# Patient Record
Sex: FEMALE | Race: WHITE | NOT HISPANIC OR LATINO | URBAN - METROPOLITAN AREA
[De-identification: names, ages, dates, MRNs, and addresses within clinical notes are randomized per-mention and may not be internally consistent; named-entity substitution may affect disease eponyms.]

---

## 2018-06-01 ENCOUNTER — EMERGENCY (EMERGENCY)
Facility: HOSPITAL | Age: 28
LOS: 1 days | Discharge: HOME | End: 2018-06-01
Attending: EMERGENCY MEDICINE
Payer: COMMERCIAL

## 2018-06-01 ENCOUNTER — OUTPATIENT (OUTPATIENT)
Dept: OUTPATIENT SERVICES | Facility: HOSPITAL | Age: 28
LOS: 1 days | End: 2018-06-01

## 2018-06-01 VITALS
WEIGHT: 199.96 LBS | RESPIRATION RATE: 20 BRPM | DIASTOLIC BLOOD PRESSURE: 72 MMHG | HEIGHT: 66 IN | SYSTOLIC BLOOD PRESSURE: 114 MMHG | OXYGEN SATURATION: 100 % | HEART RATE: 80 BPM | TEMPERATURE: 98 F

## 2018-06-01 PROCEDURE — 99283 EMERGENCY DEPT VISIT LOW MDM: CPT

## 2018-06-01 PROCEDURE — 99284 EMERGENCY DEPT VISIT MOD MDM: CPT

## 2018-06-01 RX ORDER — AZITHROMYCIN 500 MG/1
1 TABLET, FILM COATED ORAL
Qty: 6 | Refills: 0 | OUTPATIENT
Start: 2018-06-01 | End: 2018-06-06

## 2018-06-01 RX ORDER — IBUPROFEN 200 MG
1 TABLET ORAL
Qty: 20 | Refills: 0 | OUTPATIENT
Start: 2018-06-01 | End: 2018-06-05

## 2018-06-01 NOTE — ED PROVIDER NOTE - OBJECTIVE STATEMENT
USOH state until about 7 days ago, sore throat, pain on swallowing, tolerates po:  fluids >> solids, denies fever, chills.  NO uri sxms.    PMH/PSH = denies  ALL = NKDA  MEDS = denies  SH = occasional smoker  No PMD yet, new insurance?

## 2018-06-06 DIAGNOSIS — R69 ILLNESS, UNSPECIFIED: ICD-10-CM

## 2018-07-01 ENCOUNTER — OUTPATIENT (OUTPATIENT)
Dept: OUTPATIENT SERVICES | Facility: HOSPITAL | Age: 28
LOS: 1 days | End: 2018-07-01

## 2018-07-16 DIAGNOSIS — Z71.89 OTHER SPECIFIED COUNSELING: ICD-10-CM

## 2018-09-01 ENCOUNTER — OUTPATIENT (OUTPATIENT)
Dept: OUTPATIENT SERVICES | Facility: HOSPITAL | Age: 28
LOS: 1 days | End: 2018-09-01
Payer: MEDICAID

## 2018-09-01 PROCEDURE — G9001: CPT

## 2018-09-14 DIAGNOSIS — Z71.89 OTHER SPECIFIED COUNSELING: ICD-10-CM

## 2018-12-01 ENCOUNTER — INPATIENT (INPATIENT)
Facility: HOSPITAL | Age: 28
LOS: 0 days | Discharge: HOME | End: 2018-12-02
Attending: OBSTETRICS & GYNECOLOGY | Admitting: OBSTETRICS & GYNECOLOGY
Payer: COMMERCIAL

## 2018-12-01 VITALS
SYSTOLIC BLOOD PRESSURE: 115 MMHG | RESPIRATION RATE: 16 BRPM | DIASTOLIC BLOOD PRESSURE: 55 MMHG | TEMPERATURE: 99 F | HEART RATE: 99 BPM

## 2018-12-01 DIAGNOSIS — O47.1 FALSE LABOR AT OR AFTER 37 COMPLETED WEEKS OF GESTATION: ICD-10-CM

## 2018-12-01 DIAGNOSIS — Z90.49 ACQUIRED ABSENCE OF OTHER SPECIFIED PARTS OF DIGESTIVE TRACT: Chronic | ICD-10-CM

## 2018-12-01 LAB
ABO RH CONFIRMATION: SIGNIFICANT CHANGE UP
APPEARANCE UR: CLEAR — SIGNIFICANT CHANGE UP
BASOPHILS # BLD AUTO: 0 K/UL — SIGNIFICANT CHANGE UP (ref 0–0.2)
BASOPHILS NFR BLD AUTO: 0.2 % — SIGNIFICANT CHANGE UP (ref 0–2)
BILIRUB UR-MCNC: NEGATIVE — SIGNIFICANT CHANGE UP
BLD GP AB SCN SERPL QL: SIGNIFICANT CHANGE UP
COLOR SPEC: YELLOW — SIGNIFICANT CHANGE UP
DIFF PNL FLD: NEGATIVE — SIGNIFICANT CHANGE UP
EOSINOPHIL # BLD AUTO: 0.3 K/UL — SIGNIFICANT CHANGE UP (ref 0–0.5)
EOSINOPHIL NFR BLD AUTO: 1.4 % — SIGNIFICANT CHANGE UP (ref 0–6)
EPI CELLS # UR: SIGNIFICANT CHANGE UP
GLUCOSE UR QL: NEGATIVE MG/DL — SIGNIFICANT CHANGE UP
HBV SURFACE AG SER-ACNC: SIGNIFICANT CHANGE UP
HBV SURFACE AG SERPL QL IA: SIGNIFICANT CHANGE UP
HCT VFR BLD CALC: 32.1 % — LOW (ref 37–47)
HGB BLD-MCNC: 10.4 G/DL — LOW (ref 12–16)
HIV 1 & 2 AB SERPL IA.RAPID: SIGNIFICANT CHANGE UP
HIV 1+2 AB+HIV1 P24 AG SERPL QL IA: SIGNIFICANT CHANGE UP
KETONES UR-MCNC: NEGATIVE — SIGNIFICANT CHANGE UP
LEUKOCYTE ESTERASE UR-ACNC: ABNORMAL
LYMPHOCYTES # BLD AUTO: 14.5 % — LOW (ref 20–55)
LYMPHOCYTES # BLD AUTO: 2.8 K/UL — SIGNIFICANT CHANGE UP (ref 1–4.8)
MCHC RBC-ENTMCNC: 28.6 PG — SIGNIFICANT CHANGE UP (ref 27–31)
MCHC RBC-ENTMCNC: 32.4 G/DL — SIGNIFICANT CHANGE UP (ref 32–36)
MCV RBC AUTO: 88.2 FL — SIGNIFICANT CHANGE UP (ref 81–99)
MONOCYTES # BLD AUTO: 1 K/UL — HIGH (ref 0–0.8)
MONOCYTES NFR BLD AUTO: 5.1 % — SIGNIFICANT CHANGE UP (ref 3–10)
NEUTROPHILS # BLD AUTO: 14.9 K/UL — HIGH (ref 1.8–8)
NEUTROPHILS NFR BLD AUTO: 78.3 % — HIGH (ref 37–73)
NITRITE UR-MCNC: NEGATIVE — SIGNIFICANT CHANGE UP
PH UR: 7 — SIGNIFICANT CHANGE UP (ref 5–8)
PLATELET # BLD AUTO: 435 K/UL — HIGH (ref 150–400)
PROT UR-MCNC: NEGATIVE MG/DL — SIGNIFICANT CHANGE UP
RBC # BLD: 3.64 M/UL — LOW (ref 4.4–5.2)
RBC # FLD: 14.6 % — SIGNIFICANT CHANGE UP (ref 11–15.6)
SP GR SPEC: 1 — LOW (ref 1.01–1.02)
TYPE + AB SCN PNL BLD: SIGNIFICANT CHANGE UP
UROBILINOGEN FLD QL: NEGATIVE MG/DL — SIGNIFICANT CHANGE UP
WBC # BLD: 19 K/UL — HIGH (ref 4.8–10.8)
WBC # FLD AUTO: 19 K/UL — HIGH (ref 4.8–10.8)
WBC UR QL: SIGNIFICANT CHANGE UP

## 2018-12-01 RX ORDER — TETANUS TOXOID, REDUCED DIPHTHERIA TOXOID AND ACELLULAR PERTUSSIS VACCINE, ADSORBED 5; 2.5; 8; 8; 2.5 [IU]/.5ML; [IU]/.5ML; UG/.5ML; UG/.5ML; UG/.5ML
0.5 SUSPENSION INTRAMUSCULAR ONCE
Qty: 0 | Refills: 0 | Status: DISCONTINUED | OUTPATIENT
Start: 2018-12-01 | End: 2018-12-02

## 2018-12-01 RX ORDER — GLYCERIN ADULT
1 SUPPOSITORY, RECTAL RECTAL AT BEDTIME
Qty: 0 | Refills: 0 | Status: DISCONTINUED | OUTPATIENT
Start: 2018-12-01 | End: 2018-12-02

## 2018-12-01 RX ORDER — ACETAMINOPHEN 500 MG
650 TABLET ORAL EVERY 6 HOURS
Qty: 0 | Refills: 0 | Status: DISCONTINUED | OUTPATIENT
Start: 2018-12-01 | End: 2018-12-02

## 2018-12-01 RX ORDER — DOCUSATE SODIUM 100 MG
100 CAPSULE ORAL
Qty: 0 | Refills: 0 | Status: DISCONTINUED | OUTPATIENT
Start: 2018-12-01 | End: 2018-12-02

## 2018-12-01 RX ORDER — DIBUCAINE 1 %
1 OINTMENT (GRAM) RECTAL EVERY 4 HOURS
Qty: 0 | Refills: 0 | Status: DISCONTINUED | OUTPATIENT
Start: 2018-12-01 | End: 2018-12-02

## 2018-12-01 RX ORDER — OXYCODONE AND ACETAMINOPHEN 5; 325 MG/1; MG/1
2 TABLET ORAL EVERY 6 HOURS
Qty: 0 | Refills: 0 | Status: DISCONTINUED | OUTPATIENT
Start: 2018-12-01 | End: 2018-12-02

## 2018-12-01 RX ORDER — OXYTOCIN 10 UNIT/ML
41.67 VIAL (ML) INJECTION
Qty: 20 | Refills: 0 | Status: DISCONTINUED | OUTPATIENT
Start: 2018-12-01 | End: 2018-12-02

## 2018-12-01 RX ORDER — SODIUM CHLORIDE 9 MG/ML
1000 INJECTION, SOLUTION INTRAVENOUS ONCE
Qty: 0 | Refills: 0 | Status: COMPLETED | OUTPATIENT
Start: 2018-12-01 | End: 2018-12-01

## 2018-12-01 RX ORDER — AER TRAVELER 0.5 G/1
1 SOLUTION RECTAL; TOPICAL EVERY 4 HOURS
Qty: 0 | Refills: 0 | Status: DISCONTINUED | OUTPATIENT
Start: 2018-12-01 | End: 2018-12-02

## 2018-12-01 RX ORDER — LANOLIN
1 OINTMENT (GRAM) TOPICAL EVERY 6 HOURS
Qty: 0 | Refills: 0 | Status: DISCONTINUED | OUTPATIENT
Start: 2018-12-01 | End: 2018-12-02

## 2018-12-01 RX ORDER — DIPHENHYDRAMINE HCL 50 MG
25 CAPSULE ORAL EVERY 6 HOURS
Qty: 0 | Refills: 0 | Status: DISCONTINUED | OUTPATIENT
Start: 2018-12-01 | End: 2018-12-02

## 2018-12-01 RX ORDER — SIMETHICONE 80 MG/1
80 TABLET, CHEWABLE ORAL EVERY 6 HOURS
Qty: 0 | Refills: 0 | Status: DISCONTINUED | OUTPATIENT
Start: 2018-12-01 | End: 2018-12-02

## 2018-12-01 RX ORDER — CITRIC ACID/SODIUM CITRATE 300-500 MG
30 SOLUTION, ORAL ORAL ONCE
Qty: 0 | Refills: 0 | Status: COMPLETED | OUTPATIENT
Start: 2018-12-01 | End: 2018-12-01

## 2018-12-01 RX ORDER — OXYCODONE AND ACETAMINOPHEN 5; 325 MG/1; MG/1
1 TABLET ORAL
Qty: 0 | Refills: 0 | Status: DISCONTINUED | OUTPATIENT
Start: 2018-12-01 | End: 2018-12-02

## 2018-12-01 RX ORDER — OXYTOCIN 10 UNIT/ML
333.33 VIAL (ML) INJECTION
Qty: 20 | Refills: 0 | Status: DISCONTINUED | OUTPATIENT
Start: 2018-12-01 | End: 2018-12-02

## 2018-12-01 RX ORDER — HYDROCORTISONE 1 %
1 OINTMENT (GRAM) TOPICAL EVERY 4 HOURS
Qty: 0 | Refills: 0 | Status: DISCONTINUED | OUTPATIENT
Start: 2018-12-01 | End: 2018-12-02

## 2018-12-01 RX ORDER — IBUPROFEN 200 MG
600 TABLET ORAL EVERY 6 HOURS
Qty: 0 | Refills: 0 | Status: DISCONTINUED | OUTPATIENT
Start: 2018-12-01 | End: 2018-12-02

## 2018-12-01 RX ORDER — SODIUM CHLORIDE 9 MG/ML
1000 INJECTION, SOLUTION INTRAVENOUS
Qty: 0 | Refills: 0 | Status: DISCONTINUED | OUTPATIENT
Start: 2018-12-01 | End: 2018-12-01

## 2018-12-01 RX ORDER — OXYTOCIN 10 UNIT/ML
333.33 VIAL (ML) INJECTION
Qty: 20 | Refills: 0 | Status: COMPLETED | OUTPATIENT
Start: 2018-12-01

## 2018-12-01 RX ORDER — SODIUM CHLORIDE 9 MG/ML
3 INJECTION INTRAMUSCULAR; INTRAVENOUS; SUBCUTANEOUS EVERY 8 HOURS
Qty: 0 | Refills: 0 | Status: DISCONTINUED | OUTPATIENT
Start: 2018-12-01 | End: 2018-12-02

## 2018-12-01 RX ORDER — PRAMOXINE HYDROCHLORIDE 150 MG/15G
1 AEROSOL, FOAM RECTAL EVERY 4 HOURS
Qty: 0 | Refills: 0 | Status: DISCONTINUED | OUTPATIENT
Start: 2018-12-01 | End: 2018-12-02

## 2018-12-01 RX ORDER — MAGNESIUM HYDROXIDE 400 MG/1
30 TABLET, CHEWABLE ORAL
Qty: 0 | Refills: 0 | Status: DISCONTINUED | OUTPATIENT
Start: 2018-12-01 | End: 2018-12-02

## 2018-12-01 RX ADMIN — SODIUM CHLORIDE 125 MILLILITER(S): 9 INJECTION, SOLUTION INTRAVENOUS at 06:40

## 2018-12-01 RX ADMIN — SODIUM CHLORIDE 2000 MILLILITER(S): 9 INJECTION, SOLUTION INTRAVENOUS at 05:50

## 2018-12-01 RX ADMIN — Medication 600 MILLIGRAM(S): at 23:22

## 2018-12-01 RX ADMIN — Medication 30 MILLILITER(S): at 07:06

## 2018-12-01 NOTE — OB PROVIDER H&P - HISTORY OF PRESENT ILLNESS
29 y/o  at 38w6d presenting to labor and delivery with contractions becoming more frequent and painful. Denies vaginal bleeding and LOF. +FM   Prenatal course significant for no prenatal care after 24w, previously prenatal care was in [facility].

## 2018-12-01 NOTE — DISCHARGE NOTE OB - CARE PLAN
Principal Discharge DX:	Vaginal delivery after previous  delivery () for second pregnancy  Goal:	rapid recovery  Assessment and plan of treatment:	Patient can transition to regular activity level and continue regular diet. Patient should follow up with OBGYN/Clinic to schedule post partum visit. Patient should contact doctor earlier should she develop persistent/increased vaginal bleeding or fever.

## 2018-12-01 NOTE — OB PROVIDER H&P - PMH
Delivery by elective  section  2006 at [place]  Vaginal delivery after previous  delivery () for second pregnancy  2017 at [place]

## 2018-12-01 NOTE — OB RN PATIENT PROFILE - PMH
Delivery by elective  section  2006 at [place]  Vaginal delivery after previous  delivery () for second pregnancy  2017 at [place]

## 2018-12-01 NOTE — CHART NOTE - NSCHARTNOTEFT_GEN_A_CORE
Subjective: Pt comfortable with epidural.     Vital Signs Last 24 Hrs  T(C): 36.7 (01 Dec 2018 11:15), Max: 37.1 (01 Dec 2018 04:56)  T(F): 98.06 (01 Dec 2018 11:15), Max: 98.8 (01 Dec 2018 04:56)  HR: 96 (01 Dec 2018 11:28) (81 - 113)  BP: 101/59 (01 Dec 2018 11:28) (101/59 - 137/69)  RR: 18 (01 Dec 2018 11:28) (16 - 20)  SpO2: 93% (01 Dec 2018 07:21) (86% - 97%)    FETAL HEART RATE: 135/mod evelyne/+ accels/ no decels -> ruptured clear fluid     [place]: q 2 minutes     CERVICAL EXAM: 8/100/-1      IMPRESSION: Pt  making progress on her own. Will continue expectant management. Will augment with pitocin if she doesn't make progress.      d/w Dr. Lee

## 2018-12-01 NOTE — OB RN PATIENT PROFILE - HIV: DATE, OB PROFILE
Patient received semi supine in bed, (+) IV fluids, (+) tele monitor , (+) very [place] , patient in NAD.
01-Dec-2018

## 2018-12-01 NOTE — OB PROVIDER H&P - NSHPPHYSICALEXAM_GEN_ALL_CORE
FHT: baseline 140, moderate variability, no accels or decels   [place]: irregularly ameena   SVE: 6-7/90/-2

## 2018-12-01 NOTE — DISCHARGE NOTE OB - PLAN OF CARE
rapid recovery Patient can transition to regular activity level and continue regular diet. Patient should follow up with OBGYN/Clinic to schedule post partum visit. Patient should contact doctor earlier should she develop persistent/increased vaginal bleeding or fever.

## 2018-12-01 NOTE — DISCHARGE NOTE OB - PATIENT PORTAL LINK FT
You can access the [facility] Patient Portal, offered by [facility], by registering with the following website: [email or web address]

## 2018-12-01 NOTE — OB PROVIDER DELIVERY SUMMARY - NSPROVIDERDELIVERYNOTE_OBGYN_ALL_OB_FT
At 1347 this P2 now P3 delivered a viable female infant, APGARS 9/9, vaginally under epidural anesthesia without complications. Baby presented in OA. No nuchal cord. The shoulders delivered under gentle guidance over an intact perineum. A delay of 30s took place before the cord was clamped and cut and the infant was placed on the mother's abdomen. Placenta delivered spontaneously intact with a normal 3 vessel cord. Uterine fundus firm with IV pitocin and fundal massage. There was one hemostatic R labial abrasion. There were no perineal, vaginal, or cervical lacerations. Weight was deferred due to skin-to-skin. Both mother and baby are doing well.   EBL 200cc

## 2018-12-01 NOTE — CHART NOTE - NSCHARTNOTEFT_GEN_A_CORE
Pt feeling rectal pressure.    T(C): 36.8 (18 @ 13:15), Max: 37.1 (18 @ 04:56)  T(F): 98.24 (18 @ 13:15), Max: 98.8 (18 @ 04:56)  HR: 99 (18 @ 13:15) (81 - 116)  BP: 101/50 (18 @ 13:15) (95/52 - 137/69)  RR: 18 (18 @ 13:15) (16 - 20)  SpO2: 93% (18 @ 07:21) (86% - 97%)    FHT (FSE): baseline 135bpm, moderate variability, +accels, no decels  [place] (IUPC): q1-2m  SVE: 10/100/0    A/P  29yo  at 38w6d in 2nd stage of labor. Cat 1 tracing. Maternal-fetal status reassuring.   - Anticipate

## 2018-12-01 NOTE — CHART NOTE - NSCHARTNOTEFT_GEN_A_CORE
Due to malfunction with tocometer and difficulty with EFM, an IUPC and FSE were placed.   Both mother and baby tolerated the procedure well.     T(C): 36.8 (18 @ 09:22), Max: 37.1 (18 @ 04:56)  T(F): 98.24 (18 @ 09:22), Max: 98.8 (18 @ 04:56)  HR: 82 (18 @ 10:21) (81 - 113)  BP: 109/55 (18 @ 10:21) (104/56 - 137/69)  RR: 18 (18 @ 10:02) (16 - 20)  SpO2: 93% (18 @ 07:21) (86% - 97%)    FHT: FSE, baseline 130bpm, moderate variability, no accels or decels  [place]: IUPC q3-5m  SVE: 7.5/90/-2    A/P  27yo  at 38w6d admitted for TOLAC in active labor. Cat 1 tracing. Maternal-fetal status reassuring.  - Continue expectant management  - Anticipate     D/W Dr. Lee

## 2018-12-01 NOTE — DISCHARGE NOTE OB - HOSPITAL COURSE
29 y/o  now PPD#2 s/p normal spontaneous vaginal delivery. Patient transferred to post partum unit, uncomplicated hospital course. At the time of discharge patient was tolerating regular diet PO, ambulating, voiding, and having bowel movements and flatus. Pain well controlled with pain medications PRN.

## 2018-12-02 VITALS
SYSTOLIC BLOOD PRESSURE: 103 MMHG | TEMPERATURE: 98 F | HEART RATE: 85 BPM | DIASTOLIC BLOOD PRESSURE: 65 MMHG | RESPIRATION RATE: 18 BRPM

## 2018-12-02 DIAGNOSIS — O34.219 MATERNAL CARE FOR UNSPECIFIED TYPE SCAR FROM PREVIOUS CESAREAN DELIVERY: ICD-10-CM

## 2018-12-02 LAB
BASOPHILS # BLD AUTO: 0 K/UL — SIGNIFICANT CHANGE UP (ref 0–0.2)
BASOPHILS NFR BLD AUTO: 0.2 % — SIGNIFICANT CHANGE UP (ref 0–2)
EOSINOPHIL # BLD AUTO: 0.3 K/UL — SIGNIFICANT CHANGE UP (ref 0–0.5)
EOSINOPHIL NFR BLD AUTO: 1.6 % — SIGNIFICANT CHANGE UP (ref 0–6)
HCT VFR BLD CALC: 29.5 % — LOW (ref 37–47)
HGB BLD-MCNC: 9.2 G/DL — LOW (ref 12–16)
LYMPHOCYTES # BLD AUTO: 19.4 % — LOW (ref 20–55)
LYMPHOCYTES # BLD AUTO: 3.2 K/UL — SIGNIFICANT CHANGE UP (ref 1–4.8)
MCHC RBC-ENTMCNC: 28.4 PG — SIGNIFICANT CHANGE UP (ref 27–31)
MCHC RBC-ENTMCNC: 31.2 G/DL — LOW (ref 32–36)
MCV RBC AUTO: 91 FL — SIGNIFICANT CHANGE UP (ref 81–99)
MONOCYTES # BLD AUTO: 0.8 K/UL — SIGNIFICANT CHANGE UP (ref 0–0.8)
MONOCYTES NFR BLD AUTO: 5.1 % — SIGNIFICANT CHANGE UP (ref 3–10)
NEUTROPHILS # BLD AUTO: 12.2 K/UL — HIGH (ref 1.8–8)
NEUTROPHILS NFR BLD AUTO: 73.3 % — HIGH (ref 37–73)
PLATELET # BLD AUTO: 410 K/UL — HIGH (ref 150–400)
RBC # BLD: 3.24 M/UL — LOW (ref 4.4–5.2)
RBC # FLD: 14.9 % — SIGNIFICANT CHANGE UP (ref 11–15.6)
RUBV IGG SER-ACNC: 0.5 INDEX — SIGNIFICANT CHANGE UP
RUBV IGG SER-IMP: NEGATIVE — SIGNIFICANT CHANGE UP
T PALLIDUM AB TITR SER: NEGATIVE — SIGNIFICANT CHANGE UP
WBC # BLD: 16.6 K/UL — HIGH (ref 4.8–10.8)
WBC # FLD AUTO: 16.6 K/UL — HIGH (ref 4.8–10.8)

## 2018-12-02 RX ORDER — IBUPROFEN 200 MG
1 TABLET ORAL
Qty: 30 | Refills: 0
Start: 2018-12-02

## 2018-12-02 RX ADMIN — Medication 1 TABLET(S): at 11:35

## 2018-12-02 RX ADMIN — Medication 600 MILLIGRAM(S): at 00:15

## 2018-12-02 NOTE — PROGRESS NOTE ADULT - PROBLEM SELECTOR PLAN 1
Continue the current pain medication.   Encourage ambulation and a regular diet.   Encourage mother baby bonding.  : voiding spontaneously.  GI: stool softners PRN.  DVTppx: ambulation.  Tentative d/c planning for PPD#2-3.

## 2018-12-02 NOTE — PROGRESS NOTE ADULT - SUBJECTIVE AND OBJECTIVE BOX
Postpartum Note Vaginal Delivery  Patient is a 27yo  s/p  day 1.    Subjective:  No acute events overnight.   Patient is tolerating diet and denies N/V.   Patient still has slight vaginal bleeding which is decreasing in amount.   She is breastfeeding and the baby is latching on.    Urinating appropriately.   -BM/-flatus.    Physical exam:  Vital Signs Last 24 Hrs  T(C): 36.8 (01 Dec 2018 19:53), Max: 36.9 (01 Dec 2018 17:12)  T(F): 98.2 (01 Dec 2018 19:53), Max: 98.4 (01 Dec 2018 17:12)  HR: 94 (01 Dec 2018 19:53) (81 - 116)  BP: 110/67 (01 Dec 2018 19:53) (58/23 - 137/69)  RR: 18 (01 Dec 2018 19:53) (18 - 20)  SpO2: 93% (01 Dec 2018 07:21) (86% - 97%)    Heart: RRR  Lungs: CTABL  Breast: non tender, not engorged   Abdomen: Soft, nontender, no distension, firm uterine fundus  Ext: No DVT signs, warm extremities    LABS:                        10.4   19.0  )-----------( 435      ( 01 Dec 2018 06:34 )             32.1

## 2019-01-09 PROCEDURE — 86780 TREPONEMA PALLIDUM: CPT

## 2019-01-09 PROCEDURE — 86703 HIV-1/HIV-2 1 RESULT ANTBDY: CPT

## 2019-01-09 PROCEDURE — 86850 RBC ANTIBODY SCREEN: CPT

## 2019-01-09 PROCEDURE — 81001 URINALYSIS AUTO W/SCOPE: CPT

## 2019-01-09 PROCEDURE — G0463: CPT

## 2019-01-09 PROCEDURE — 85027 COMPLETE CBC AUTOMATED: CPT

## 2019-01-09 PROCEDURE — 59025 FETAL NON-STRESS TEST: CPT

## 2019-01-09 PROCEDURE — 36415 COLL VENOUS BLD VENIPUNCTURE: CPT

## 2019-01-09 PROCEDURE — 86901 BLOOD TYPING SEROLOGIC RH(D): CPT

## 2019-01-09 PROCEDURE — 86762 RUBELLA ANTIBODY: CPT

## 2019-01-09 PROCEDURE — 59050 FETAL MONITOR W/REPORT: CPT

## 2019-01-09 PROCEDURE — 87389 HIV-1 AG W/HIV-1&-2 AB AG IA: CPT

## 2019-01-09 PROCEDURE — 87340 HEPATITIS B SURFACE AG IA: CPT

## 2019-01-09 PROCEDURE — 86900 BLOOD TYPING SEROLOGIC ABO: CPT

## 2019-12-09 NOTE — OB PROVIDER DELIVERY SUMMARY - NS_DELIVERYANESTHES_OBGYN_ALL_OB_FT
Clinic Administered Medication Documentation    MEDICATION LIST:   Depo Provera Documentation    Prior to injection, verified patient identity using patient's name and date of birth. Medication was administered. Please see MAR and medication order for additional information. Patient instructed to remain in clinic for 15 minutes.    BP: 105/65    LAST PAP/EXAM: No results found for: PAP  URINE HCG:not indicated    NEXT INJECTION DUE: 2/24/20 - 3/17/20    Was entire vial of medication used? Yes  Vial/Syringe: Single dose vial  Expiration Date:  12/2020       Name of provider who requested the medication administration: Dr Rosales  Name of provider on site (faculty or community preceptor) at the time of performing the medication administration:  Dr Marie supervising doctor    Administered by Suma Kaur CMA    Date of next administration: 2/24/20- 3/17/20  Date of next office visit with provider to renew medication plan (must be seen annually): 12/09/2020      
Due to patient being non-English speaking/uses sign language, an  was used for this visit. Only for face-to-face interpretation by an external agency, date and length of interpretation can be found on the scanned worksheet.     name: Claire  Agency: [employer]  Language: Maryann   Telephone number: [phone]  Type of interpretation: Face-to-face, spoken      
Nader

## 2020-06-11 NOTE — OB RN PATIENT PROFILE - EDUCATION ON THE POTENTIAL RISKS AND IMPACT OF EARLY USE OF PACIFIERS ON THE ESTABLISHMENT OF BREASTFEEDING
Jose Ruiz  CARDIAC ELECTROPHYSIOLOGY  [address] [address] [place] [address]  Phone: [phone]  Fax: [phone]  Follow Up Time: Statement Selected

## 2021-01-11 ENCOUNTER — INPATIENT (INPATIENT)
Facility: HOSPITAL | Age: 31
LOS: 1 days | Discharge: HOME | End: 2021-01-13
Attending: OBSTETRICS & GYNECOLOGY | Admitting: OBSTETRICS & GYNECOLOGY
Payer: COMMERCIAL

## 2021-01-11 VITALS
HEIGHT: 66 IN | TEMPERATURE: 99 F | DIASTOLIC BLOOD PRESSURE: 70 MMHG | WEIGHT: 175.05 LBS | HEART RATE: 105 BPM | SYSTOLIC BLOOD PRESSURE: 109 MMHG | RESPIRATION RATE: 18 BRPM

## 2021-01-11 DIAGNOSIS — O47.1 FALSE LABOR AT OR AFTER 37 COMPLETED WEEKS OF GESTATION: ICD-10-CM

## 2021-01-11 DIAGNOSIS — O26.893 OTHER SPECIFIED PREGNANCY RELATED CONDITIONS, THIRD TRIMESTER: ICD-10-CM

## 2021-01-11 DIAGNOSIS — Z90.49 ACQUIRED ABSENCE OF OTHER SPECIFIED PARTS OF DIGESTIVE TRACT: Chronic | ICD-10-CM

## 2021-01-11 DIAGNOSIS — Z3A.38 38 WEEKS GESTATION OF PREGNANCY: ICD-10-CM

## 2021-01-11 PROBLEM — O34.219 MATERNAL CARE FOR UNSPECIFIED TYPE SCAR FROM PREVIOUS CESAREAN DELIVERY: Chronic | Status: ACTIVE | Noted: 2018-12-01

## 2021-01-11 LAB
AMPHET UR-MCNC: POSITIVE
BARBITURATES UR SCN-MCNC: NEGATIVE — SIGNIFICANT CHANGE UP
BASOPHILS # BLD AUTO: 0.09 K/UL — SIGNIFICANT CHANGE UP (ref 0–0.2)
BASOPHILS NFR BLD AUTO: 0.4 % — SIGNIFICANT CHANGE UP (ref 0–2)
BENZODIAZ UR-MCNC: NEGATIVE — SIGNIFICANT CHANGE UP
BLD GP AB SCN SERPL QL: SIGNIFICANT CHANGE UP
COCAINE METAB.OTHER UR-MCNC: NEGATIVE — SIGNIFICANT CHANGE UP
EOSINOPHIL # BLD AUTO: 0.18 K/UL — SIGNIFICANT CHANGE UP (ref 0–0.5)
EOSINOPHIL NFR BLD AUTO: 0.8 % — SIGNIFICANT CHANGE UP (ref 0–6)
HCT VFR BLD CALC: 35.2 % — SIGNIFICANT CHANGE UP (ref 34.5–45)
HGB BLD-MCNC: 11.5 G/DL — SIGNIFICANT CHANGE UP (ref 11.5–15.5)
HIV 1 & 2 AB SERPL IA.RAPID: SIGNIFICANT CHANGE UP
IMM GRANULOCYTES NFR BLD AUTO: 0.7 % — SIGNIFICANT CHANGE UP (ref 0–1.5)
LYMPHOCYTES # BLD AUTO: 14.3 % — SIGNIFICANT CHANGE UP (ref 13–44)
LYMPHOCYTES # BLD AUTO: 3.36 K/UL — HIGH (ref 1–3.3)
MCHC RBC-ENTMCNC: 30 PG — SIGNIFICANT CHANGE UP (ref 27–34)
MCHC RBC-ENTMCNC: 32.7 GM/DL — SIGNIFICANT CHANGE UP (ref 32–36)
MCV RBC AUTO: 91.9 FL — SIGNIFICANT CHANGE UP (ref 80–100)
METHADONE UR-MCNC: NEGATIVE — SIGNIFICANT CHANGE UP
MONOCYTES # BLD AUTO: 1.13 K/UL — HIGH (ref 0–0.9)
MONOCYTES NFR BLD AUTO: 4.8 % — SIGNIFICANT CHANGE UP (ref 2–14)
NEUTROPHILS # BLD AUTO: 18.6 K/UL — HIGH (ref 1.8–7.4)
NEUTROPHILS NFR BLD AUTO: 79 % — HIGH (ref 43–77)
OPIATES UR-MCNC: NEGATIVE — SIGNIFICANT CHANGE UP
PCP SPEC-MCNC: SIGNIFICANT CHANGE UP
PCP UR-MCNC: NEGATIVE — SIGNIFICANT CHANGE UP
PLATELET # BLD AUTO: 406 K/UL — HIGH (ref 150–400)
RBC # BLD: 3.83 M/UL — SIGNIFICANT CHANGE UP (ref 3.8–5.2)
RBC # FLD: 13.3 % — SIGNIFICANT CHANGE UP (ref 10.3–14.5)
SARS-COV-2 RNA SPEC QL NAA+PROBE: SIGNIFICANT CHANGE UP
THC UR QL: POSITIVE
WBC # BLD: 23.52 K/UL — HIGH (ref 3.8–10.5)
WBC # FLD AUTO: 23.52 K/UL — HIGH (ref 3.8–10.5)

## 2021-01-11 PROCEDURE — 59409 OBSTETRICAL CARE: CPT | Mod: U9,GC

## 2021-01-11 PROCEDURE — 88307 TISSUE EXAM BY PATHOLOGIST: CPT | Mod: 26

## 2021-01-11 RX ORDER — LANOLIN
1 OINTMENT (GRAM) TOPICAL EVERY 6 HOURS
Refills: 0 | Status: DISCONTINUED | OUTPATIENT
Start: 2021-01-11 | End: 2021-01-13

## 2021-01-11 RX ORDER — KETOROLAC TROMETHAMINE 30 MG/ML
30 SYRINGE (ML) INJECTION ONCE
Refills: 0 | Status: DISCONTINUED | OUTPATIENT
Start: 2021-01-11 | End: 2021-01-13

## 2021-01-11 RX ORDER — OXYCODONE HYDROCHLORIDE 5 MG/1
5 TABLET ORAL ONCE
Refills: 0 | Status: DISCONTINUED | OUTPATIENT
Start: 2021-01-11 | End: 2021-01-13

## 2021-01-11 RX ORDER — MAGNESIUM HYDROXIDE 400 MG/1
30 TABLET, CHEWABLE ORAL
Refills: 0 | Status: DISCONTINUED | OUTPATIENT
Start: 2021-01-11 | End: 2021-01-13

## 2021-01-11 RX ORDER — OXYTOCIN 10 UNIT/ML
333.33 VIAL (ML) INJECTION
Qty: 20 | Refills: 0 | Status: DISCONTINUED | OUTPATIENT
Start: 2021-01-11 | End: 2021-01-11

## 2021-01-11 RX ORDER — IBUPROFEN 200 MG
1 TABLET ORAL
Qty: 40 | Refills: 0
Start: 2021-01-11 | End: 2021-01-20

## 2021-01-11 RX ORDER — ACETAMINOPHEN 500 MG
3 TABLET ORAL
Qty: 120 | Refills: 0
Start: 2021-01-11 | End: 2021-01-20

## 2021-01-11 RX ORDER — OXYCODONE HYDROCHLORIDE 5 MG/1
5 TABLET ORAL
Refills: 0 | Status: DISCONTINUED | OUTPATIENT
Start: 2021-01-11 | End: 2021-01-13

## 2021-01-11 RX ORDER — IBUPROFEN 200 MG
600 TABLET ORAL EVERY 6 HOURS
Refills: 0 | Status: COMPLETED | OUTPATIENT
Start: 2021-01-11 | End: 2021-12-10

## 2021-01-11 RX ORDER — PRAMOXINE HYDROCHLORIDE 150 MG/15G
1 AEROSOL, FOAM RECTAL EVERY 4 HOURS
Refills: 0 | Status: DISCONTINUED | OUTPATIENT
Start: 2021-01-11 | End: 2021-01-13

## 2021-01-11 RX ORDER — CITRIC ACID/SODIUM CITRATE 300-500 MG
30 SOLUTION, ORAL ORAL ONCE
Refills: 0 | Status: DISCONTINUED | OUTPATIENT
Start: 2021-01-11 | End: 2021-01-11

## 2021-01-11 RX ORDER — TETANUS TOXOID, REDUCED DIPHTHERIA TOXOID AND ACELLULAR PERTUSSIS VACCINE, ADSORBED 5; 2.5; 8; 8; 2.5 [IU]/.5ML; [IU]/.5ML; UG/.5ML; UG/.5ML; UG/.5ML
0.5 SUSPENSION INTRAMUSCULAR ONCE
Refills: 0 | Status: DISCONTINUED | OUTPATIENT
Start: 2021-01-11 | End: 2021-01-13

## 2021-01-11 RX ORDER — DIBUCAINE 1 %
1 OINTMENT (GRAM) RECTAL EVERY 6 HOURS
Refills: 0 | Status: DISCONTINUED | OUTPATIENT
Start: 2021-01-11 | End: 2021-01-13

## 2021-01-11 RX ORDER — ACETAMINOPHEN 500 MG
975 TABLET ORAL
Refills: 0 | Status: DISCONTINUED | OUTPATIENT
Start: 2021-01-11 | End: 2021-01-13

## 2021-01-11 RX ORDER — AER TRAVELER 0.5 G/1
1 SOLUTION RECTAL; TOPICAL EVERY 4 HOURS
Refills: 0 | Status: DISCONTINUED | OUTPATIENT
Start: 2021-01-11 | End: 2021-01-13

## 2021-01-11 RX ORDER — DIPHENHYDRAMINE HCL 50 MG
25 CAPSULE ORAL EVERY 6 HOURS
Refills: 0 | Status: DISCONTINUED | OUTPATIENT
Start: 2021-01-11 | End: 2021-01-13

## 2021-01-11 RX ORDER — OXYTOCIN 10 UNIT/ML
10 VIAL (ML) INJECTION ONCE
Refills: 0 | Status: COMPLETED | OUTPATIENT
Start: 2021-01-11 | End: 2021-01-11

## 2021-01-11 RX ORDER — SODIUM CHLORIDE 9 MG/ML
3 INJECTION INTRAMUSCULAR; INTRAVENOUS; SUBCUTANEOUS EVERY 8 HOURS
Refills: 0 | Status: DISCONTINUED | OUTPATIENT
Start: 2021-01-11 | End: 2021-01-13

## 2021-01-11 RX ORDER — HYDROCORTISONE 1 %
1 OINTMENT (GRAM) TOPICAL EVERY 6 HOURS
Refills: 0 | Status: DISCONTINUED | OUTPATIENT
Start: 2021-01-11 | End: 2021-01-13

## 2021-01-11 RX ORDER — BENZOCAINE 10 %
1 GEL (GRAM) MUCOUS MEMBRANE EVERY 6 HOURS
Refills: 0 | Status: DISCONTINUED | OUTPATIENT
Start: 2021-01-11 | End: 2021-01-13

## 2021-01-11 RX ORDER — SODIUM CHLORIDE 9 MG/ML
1000 INJECTION, SOLUTION INTRAVENOUS
Refills: 0 | Status: DISCONTINUED | OUTPATIENT
Start: 2021-01-11 | End: 2021-01-11

## 2021-01-11 RX ORDER — SIMETHICONE 80 MG/1
80 TABLET, CHEWABLE ORAL EVERY 4 HOURS
Refills: 0 | Status: DISCONTINUED | OUTPATIENT
Start: 2021-01-11 | End: 2021-01-13

## 2021-01-11 RX ORDER — OXYTOCIN 10 UNIT/ML
333.33 VIAL (ML) INJECTION
Qty: 20 | Refills: 0 | Status: DISCONTINUED | OUTPATIENT
Start: 2021-01-11 | End: 2021-01-13

## 2021-01-11 RX ADMIN — Medication 10 UNIT(S): at 15:06

## 2021-01-11 RX ADMIN — SODIUM CHLORIDE 125 MILLILITER(S): 9 INJECTION, SOLUTION INTRAVENOUS at 14:50

## 2021-01-11 RX ADMIN — Medication 1000 MILLIUNIT(S)/MIN: at 15:25

## 2021-01-11 NOTE — OB RN DELIVERY SUMMARY - NS_SEPSISRSKCALC_OBGYN_ALL_OB_FT
EOS calculated successfully. EOS Risk Factor: 0.5/1000 live births ([facility] national incidence); GA=38w2d; Temp=98.6; ROM=0.15; GBS='Unknown'; Antibiotics='No antibiotics or any antibiotics < 2 hrs prior to birth'

## 2021-01-11 NOTE — OB PROVIDER H&P - ASSESSMENT
30yo  at 38 2/7 weeks gestation presenting to labor and delivery with strong contractions and urge to push.    - Admit to L&D  - Admission labs and prenatal labs sent  - Consent at bedside  - proceed with vaginal delivery

## 2021-01-11 NOTE — DISCHARGE NOTE OB - CARE PLAN
Principal Discharge DX:	Vaginal delivery after previous  delivery () for second pregnancy  Goal:	Rapid recovery  Assessment and plan of treatment:	Patient should transition to regular activity level. Resume regular diet. Patient should follow up with her OB for a postpartum checkup 3-6 weeks after delivery. Patient should call her doctor sooner if she develops a fever or uncontrolled vaginal bleeding. Please call sooner if there are any other concerns.

## 2021-01-11 NOTE — DISCHARGE NOTE OB - PATIENT PORTAL LINK FT
You can access the FollowMyHealth Patient Portal offered by [facility] by registering at the following website: [email or web address]. By joining [employer]’s FollowMyHealth portal, you will also be able to view your health information using other applications (apps) compatible with our system.

## 2021-01-11 NOTE — OB PROVIDER DELIVERY SUMMARY - NSMATERNALFETALCONCERNS_OBGYN_ALL_OB_FT
2 prenatal visits [facility]  last prenatal visit 3 months ago  Patient states she couldn't find a doctor here   Social work consult ordered

## 2021-01-11 NOTE — OB RN DELIVERY SUMMARY - NSMATERNALFETALCONCERNS_OBGYN_ALL_OB_FT
[place] 2 prenatal visits [facility]  last prenatal visit 3 months ago  Patient states she couldn't find a doctor here   Social work consult ordered

## 2021-01-11 NOTE — OB PROVIDER DELIVERY SUMMARY - NSPROVIDERDELIVERYNOTE_OBGYN_ALL_OB_FT
Procedure: Normal vaginal delivery  Findings: Viable female infant delivered in cephalic presentation at 1506, placenta delivered at 1512. Apgar 9/9.    Laceration: none  Procedure: Patient presented at 9cm dilation, underwent AROM and was found to be fully dilated, +2 station. She pushed effectively and in conjunction with maternal effort, she delivered a viable female infant over intact perineum. Placenta delivered intact. Perineum and vagina were inspected, no lacerations present. IM pitocin given, excellent hemostasis obtained. Procedure: Normal vaginal delivery  Findings: Viable female infant delivered in cephalic presentation at 1506, placenta delivered at 1512. Apgar 9/9.    Laceration: none  Procedure: Patient presented at 9cm dilation, underwent AROM and was found to be fully dilated, +2 station. She pushed effectively and in conjunction with maternal effort, she delivered a viable female infant over intact perineum. Placenta delivered intact. Perineum and vagina were inspected, no lacerations present. IM pitocin given, excellent hemostasis obtained.    OB attending:   resident note reviewed, uncomplicated vaginal delivery  Ban Appiah MD

## 2021-01-11 NOTE — DISCHARGE NOTE OB - MEDICATION SUMMARY - MEDICATIONS TO TAKE
I will START or STAY ON the medications listed below when I get home from the hospital:    acetaminophen 325 mg oral tablet  -- 3 tab(s) by mouth every 6 hours, As Needed -for moderate pain  - MDD:4,000 mg   -- Indication: For moderate pain    ibuprofen 600 mg oral tablet  -- 1 tab(s) by mouth every 6 hours, As Needed -for moderate pain   -- Indication: For moderate pain    Prenatal Multivitamins with Folic Acid 1 mg oral tablet  -- 1 tab(s) by mouth once a day  -- Indication: For postpartum recovery

## 2021-01-11 NOTE — OB PROVIDER H&P - PMH
Delivery by elective  section  2006 at [place]  Vaginal delivery after previous  delivery () for second pregnancy  2017 at [place]

## 2021-01-11 NOTE — OB PROVIDER H&P - ATTENDING COMMENTS
Patient admitted in active labor at 38+ weeks, fully dilated.  Insufficient prenatal care this pregnancy.    FHRT reassuring  Uncomplicated vaginal delivery  All labs pending  Ban Appiah MD

## 2021-01-11 NOTE — OB PROVIDER H&P - HISTORY OF PRESENT ILLNESS
28yo  at 38 2/7 weeks gestation presenting to labor and delivery with strong contractions and urge to push.    She states she lost her mucous plug yesterday and contractions started last night. She states they rapidly increased in intensity over the last hour. She denies vaginal bleeding and leakage of fluid.    This pregnancy has been complicated by: scant prenatal care, [facility] when 3mo pregnant and she did not establish care on [place]    OBHx:  G1- pCS, failure to progress,   G2- , uncomplicated,   G3- , uncomplicated, 2018  PMH: denies  PSH: open cholecystectomy  Meds: PNV  All: PCN, bactrim, duracef

## 2021-01-11 NOTE — OB PROVIDER H&P - NSMATERNALFETALCONCERNS_OBGYN_ALL_OB_FT
2 prenatal visits [facility].  last prenatal visit 3 months ago  Patient states she couldn't find a doctor here   Social work consult ordered

## 2021-01-11 NOTE — DISCHARGE NOTE OB - HOSPITAL COURSE
She is a 31 yo now  who presented at 38 weeks 2 days  GA in active labor and had a normal vaginal delivery after previous  section. She has a history of scant prenatal care and urine toxicology was positive for THC and amphetamines. She had a normal postpartum course and was discharged home in stable condition. Upon discharge she was voiding, tolerating PO, and ambulating.

## 2021-01-11 NOTE — OB PROVIDER H&P - NSHPPHYSICALEXAM_GEN_ALL_CORE
Vital Signs Last 24 Hrs  T(C): 37 (11 Jan 2021 14:45), Max: 37 (11 Jan 2021 14:45)  T(F): 98.6 (11 Jan 2021 14:45), Max: 98.6 (11 Jan 2021 14:45)  HR: 96 (11 Jan 2021 15:17) (96 - 105)  BP: 100/53 (11 Jan 2021 15:17) (100/53 - 109/70)  RR: 18 (11 Jan 2021 14:45) (18 - 18)    FHT: baseline 145, moderate variability, + accels, - decels  [place]: ctx q2-3min    Gen: moderate distress with contractions, AOx3  CV: RRR  Pulm: CTAB  Abd: soft, nontender, gravid, strong contractions palpated  SVE: 9/100/0, bulging bag

## 2021-01-11 NOTE — DISCHARGE NOTE OB - PROVIDER TOKENS
FREE:[LAST:[[facility]],PHONE:[phone]],FAX:[phone]],ADDRESS:[[address] [place] [address]]]

## 2021-01-11 NOTE — DISCHARGE NOTE OB - CARE PROVIDER_API CALL
[facility],   [address] [place] [address]  Phone: [phone]  Fax: [phone]  Follow Up Time:

## 2021-01-12 LAB
HBV SURFACE AG SERPL QL IA: SIGNIFICANT CHANGE UP
HCT VFR BLD CALC: 31.5 % — LOW (ref 34.5–45)
HGB BLD-MCNC: 10.1 G/DL — LOW (ref 11.5–15.5)
HIV 1+2 AB+HIV1 P24 AG SERPL QL IA: SIGNIFICANT CHANGE UP
MCHC RBC-ENTMCNC: 29.9 PG — SIGNIFICANT CHANGE UP (ref 27–34)
MCHC RBC-ENTMCNC: 32.1 GM/DL — SIGNIFICANT CHANGE UP (ref 32–36)
MCV RBC AUTO: 93.2 FL — SIGNIFICANT CHANGE UP (ref 80–100)
MEV IGG SER-ACNC: 111 AU/ML — SIGNIFICANT CHANGE UP
MEV IGG+IGM SER-IMP: POSITIVE — SIGNIFICANT CHANGE UP
PLATELET # BLD AUTO: 370 K/UL — SIGNIFICANT CHANGE UP (ref 150–400)
RBC # BLD: 3.38 M/UL — LOW (ref 3.8–5.2)
RBC # FLD: 13.5 % — SIGNIFICANT CHANGE UP (ref 10.3–14.5)
RUBV IGG SER-ACNC: 1.5 INDEX — SIGNIFICANT CHANGE UP
RUBV IGG SER-IMP: POSITIVE — SIGNIFICANT CHANGE UP
SARS-COV-2 IGG SERPL QL IA: NEGATIVE — SIGNIFICANT CHANGE UP
SARS-COV-2 IGM SERPL IA-ACNC: <0.1 INDEX — SIGNIFICANT CHANGE UP
T PALLIDUM AB TITR SER: NEGATIVE — SIGNIFICANT CHANGE UP
WBC # BLD: 19.37 K/UL — HIGH (ref 3.8–10.5)
WBC # FLD AUTO: 19.37 K/UL — HIGH (ref 3.8–10.5)

## 2021-01-12 PROCEDURE — 99232 SBSQ HOSP IP/OBS MODERATE 35: CPT | Mod: GC

## 2021-01-12 RX ORDER — IBUPROFEN 200 MG
600 TABLET ORAL EVERY 6 HOURS
Refills: 0 | Status: DISCONTINUED | OUTPATIENT
Start: 2021-01-12 | End: 2021-01-13

## 2021-01-12 NOTE — PROGRESS NOTE ADULT - ATTENDING COMMENTS
Patient seen and examined, no complaints   VS and labs reviewed  fundus firm/NT  +utox on admission for THC and amphetamines,  tox+   CPS was called   pending social work and  evaluations

## 2021-01-12 NOTE — PROGRESS NOTE ADULT - ASSESSMENT
Mayuri Sher is a 30y  now PPD#1 s/p spontaneous vaginal delivery at 37.2 weeks gestation, uncomplicated.      -Vital Signs [place]   -Voiding, tolerating PO, passed flatus   -Heme: follow up repeat hgb, antepartum hgb of 11.5  -Social work consult (patient with only 2 prenatal visits during pregnancy, +tox for THC & amphetamines with 3 other children at home)   -Advance care as tolerated   -Continue routine postpartum/postoperative care and education  -Dispo: Pt is stable for discharge to home pending attending approval.  Mayuri Sher is a 30y  now PPD#1 s/p spontaneous vaginal delivery at 37.2 weeks gestation, uncomplicated to healthy female infant.      -Vital Signs [place]   -Voiding, tolerating PO, passed flatus   -Heme: follow up repeat hgb, antepartum hgb of 11.5  -Social work consult (patient with only 2 prenatal visits during pregnancy, +tox for THC & amphetamines with 3 other children at home)   -Advance care as tolerated   -Continue routine postpartum/postoperative care and education  -Dispo: Pt is stable for discharge to home pending attending approval.  Mayuri Sher is a 30y  now PPD#1 s/p spontaneous vaginal delivery () at 38.2 weeks gestation, uncomplicated to healthy female infant.      -Vital Signs [place]   -Voiding, tolerating PO, passed flatus   -Heme: follow up repeat hgb, antepartum hgb of 11.5  -Social work consult (patient with only 2 prenatal visits during pregnancy, +tox for THC & amphetamines with 3 other children at home)   -Advance care as tolerated   -Continue routine postpartum/postoperative care and education  -Dispo: Pt is stable for discharge to home pending attending approval.

## 2021-01-12 NOTE — PROGRESS NOTE ADULT - ASSESSMENT
A/P: 30y  now PPD#1 s/p vacuum assisted vaginal delivery at 38.2 weeks gestation, uncomplicated.   -Vital signs stable  -Hgb: 11.5 -> AM labs pending   -Voiding, tolerating PO, bowel function nml   -Advance care as tolerated   -Continue routine postpartum care and education  -Healthy female infant  -Dispo: Pt is stable for discharge to home pending attending approval. A/P: 30y  now PPD#1 s/p   at 38.2 weeks gestation, uncomplicated.   -Vital signs stable  -Hgb: 11.5 -> AM labs pending   -Voiding, tolerating PO, bowel function nml   -Advance care as tolerated   -Continue routine postpartum care and education  -Healthy female infant  -Dispo: Pt is stable for discharge to home pending attending approval.

## 2021-01-12 NOTE — PROGRESS NOTE ADULT - SUBJECTIVE AND OBJECTIVE BOX
Mayuri Sher is a 30y  now PPD#1 s/p spontaneous vaginal delivery at 37.2 weeks gestation, uncomplicated.     Subjective:  Mayuri Sher is a 30y  now PPD#1 s/p spontaneous vaginal delivery at 37.2 weeks gestation, uncomplicated. She reports some lower, mild abdominal tenderness. States pain is well controlled and she has declined analgesics. Ambulating without difficulty. Tolerating PO. Has voided and passed flatus. Denies headaches, visual disturbances, new onset edema, chest pain,and shortness of breath.     Objective:   Gen: NAD, AOx3  CV: RRR  Pulm: CTAB  Breast: Nontender, not engorged   Abdomen:  Soft, non-tender, non-distended, +bowel sounds.  Uterus:  Fundus firm below umbilicus  VE:  +Lochia  Ext:  Non-tender, non-edematous  Mayuri Sher is a 30y  now PPD#1 s/p spontaneous vaginal delivery at 37.2 weeks gestation, uncomplicated. She reports some lower, mild abdominal tenderness. States pain is well controlled and she has declined analgesics. Ambulating without difficulty. Tolerating PO. Has voided and passed flatus. Denies headaches, visual disturbances, new onset edema, chest pain,and shortness of breath.     Objective:   Gen: NAD, AOx3  CV: RRR  Pulm: CTAB  Breast: Nontender, not engorged   Abdomen:  Soft, non-tender, non-distended, +bowel sounds.  Uterus:  Fundus firm below umbilicus  VE:  +Lochia  Ext:  Non-tender, non-edematous     MEDICATIONS  (STANDING):  acetaminophen   Tablet .. 975 milliGRAM(s) Oral <User Schedule>  diphtheria/tetanus/pertussis (acellular) Vaccine (ADAcel) 0.5 milliLiter(s) IntraMuscular once  ibuprofen  Tablet. 600 milliGRAM(s) Oral every 6 hours  ketorolac   Injectable 30 milliGRAM(s) IV Push once  oxytocin Infusion 333.333 milliUNIT(s)/Min (1000 mL/Hr) IV Continuous <Continuous>  prenatal multivitamin 1 Tablet(s) Oral daily  sodium chloride 0.9% lock flush 3 milliLiter(s) IV Push every 8 hours    MEDICATIONS  (PRN):  benzocaine 20%/menthol 0.5% Spray 1 Spray(s) Topical every 6 hours PRN for Perineal discomfort  dibucaine 1% Ointment 1 Application(s) Topical every 6 hours PRN Perineal discomfort  diphenhydrAMINE 25 milliGRAM(s) Oral every 6 hours PRN Pruritus  hydrocortisone 1% Cream 1 Application(s) Topical every 6 hours PRN Moderate Pain (4-6)  lanolin Ointment 1 Application(s) Topical every 6 hours PRN nipple soreness  magnesium hydroxide Suspension 30 milliLiter(s) Oral two times a day PRN Constipation  oxyCODONE    IR 5 milliGRAM(s) Oral every 3 hours PRN Moderate to Severe Pain (4-10)  oxyCODONE    IR 5 milliGRAM(s) Oral once PRN Moderate to Severe Pain (4-10)  pramoxine 1%/zinc 5% Cream 1 Application(s) Topical every 4 hours PRN Moderate Pain (4-6)  simethicone 80 milliGRAM(s) Chew every 4 hours PRN Gas  witch hazel Pads 1 Application(s) Topical every 4 hours PRN Perineal discomfort      Vital Signs Last 24 Hrs  T(C): 36.7 (2021 04:00), Max: 37 (2021 14:45)  T(F): 98 (2021 04:00), Max: 98.6 (2021 14:45)  HR: 78 (2021 04:00) (78 - 105)  BP: 99/62 (2021 04:00) (92/51 - 111/58)  BP(mean): --  RR: 16 (2021 04:00) (16 - 18)  SpO2: 98% (2021 04:00) (97% - 99%)                          11.5   23.52 )-----------( 406      ( 2021 16:12 )             35.2                 RADIOLOGY & ADDITIONAL STUDIES: Mayuri Sher is a 30y  now PPD#1 s/p spontaneous vaginal delivery () at 38.2 weeks gestation, uncomplicated. She reports some lower, mild abdominal tenderness. States pain is well controlled and she has declined analgesics. Ambulating without difficulty. Tolerating PO. Has voided and passed flatus. Denies headaches, visual disturbances, new onset edema, chest pain,and shortness of breath.     Objective:   Gen: NAD, AOx3  CV: RRR  Pulm: CTAB  Breast: Nontender, not engorged   Abdomen:  Soft, non-tender, non-distended, +bowel sounds.  Uterus:  Fundus firm below umbilicus  VE:  +Lochia  Ext:  Non-tender, non-edematous     MEDICATIONS  (STANDING):  acetaminophen   Tablet .. 975 milliGRAM(s) Oral <User Schedule>  diphtheria/tetanus/pertussis (acellular) Vaccine (ADAcel) 0.5 milliLiter(s) IntraMuscular once  ibuprofen  Tablet. 600 milliGRAM(s) Oral every 6 hours  ketorolac   Injectable 30 milliGRAM(s) IV Push once  oxytocin Infusion 333.333 milliUNIT(s)/Min (1000 mL/Hr) IV Continuous <Continuous>  prenatal multivitamin 1 Tablet(s) Oral daily  sodium chloride 0.9% lock flush 3 milliLiter(s) IV Push every 8 hours    MEDICATIONS  (PRN):  benzocaine 20%/menthol 0.5% Spray 1 Spray(s) Topical every 6 hours PRN for Perineal discomfort  dibucaine 1% Ointment 1 Application(s) Topical every 6 hours PRN Perineal discomfort  diphenhydrAMINE 25 milliGRAM(s) Oral every 6 hours PRN Pruritus  hydrocortisone 1% Cream 1 Application(s) Topical every 6 hours PRN Moderate Pain (4-6)  lanolin Ointment 1 Application(s) Topical every 6 hours PRN nipple soreness  magnesium hydroxide Suspension 30 milliLiter(s) Oral two times a day PRN Constipation  oxyCODONE    IR 5 milliGRAM(s) Oral every 3 hours PRN Moderate to Severe Pain (4-10)  oxyCODONE    IR 5 milliGRAM(s) Oral once PRN Moderate to Severe Pain (4-10)  pramoxine 1%/zinc 5% Cream 1 Application(s) Topical every 4 hours PRN Moderate Pain (4-6)  simethicone 80 milliGRAM(s) Chew every 4 hours PRN Gas  witch hazel Pads 1 Application(s) Topical every 4 hours PRN Perineal discomfort      Vital Signs Last 24 Hrs  T(C): 36.7 (2021 04:00), Max: 37 (2021 14:45)  T(F): 98 (2021 04:00), Max: 98.6 (2021 14:45)  HR: 78 (2021 04:00) (78 - 105)  BP: 99/62 (2021 04:00) (92/51 - 111/58)  RR: 16 (2021 04:00) (16 - 18)  SpO2: 98% (2021 04:00) (97% - 99%)                          11.5   23.52 )-----------( 406      ( 2021 16:12 )             35.2

## 2021-01-12 NOTE — PROGRESS NOTE ADULT - SUBJECTIVE AND OBJECTIVE BOX
KAMILLE MCHUGH is a 30y  now PPD#1 s/p vacuum assisted vaginal delivery at 38.2 weeks gestation, uncomplicated.    S:    The patient has no complaints.  Pain controlled with current treatment regimen.   She is ambulating without difficulty and tolerating PO.   +flatus/-BM/+ voiding   She endorses appropriate lochia, which is decreasing.   Patient denies lightheadedness, dizziness, palpitations, shortness of breath and chest pain.   Patient desires to go home.     O:    T(C): 36.7 (21 @ 04:00), Max: 37 (21 @ 14:45)  HR: 78 (21 @ 04:00) (78 - 105)  BP: 99/62 (21 @ 04:00) (92/51 - 111/58)  RR: 16 (21 @ 04:00) (16 - 18)  SpO2: 98% (21 @ 04:00) (97% - 99%)    Gen: NAD, AOx3  CV: RRR  Pulm: CTAB  Breast: Nontender, non-engorged   Abdomen:  Soft, non-tender, non-distended, +bowel sounds  Uterus:  Fundus firm below umbilicus  VE:  +Lochia  Ext:  Non-tender and non-edematous                          11.5   23.52 )-----------( 406      ( 2021 16:12 )             35.2                KAMILLE MCHUGH is a 30y  now PPD#1 s/p  at 38.2 weeks gestation, uncomplicated.    S:    The patient has no complaints.  Pain controlled with current treatment regimen.   She is ambulating without difficulty and tolerating PO.   +flatus/-BM/+ voiding   She endorses appropriate lochia, which is decreasing.   Patient denies lightheadedness, dizziness, palpitations, shortness of breath and chest pain.   Patient desires to go home.     O:    T(C): 36.7 (21 @ 04:00), Max: 37 (21 @ 14:45)  HR: 78 (21 @ 04:00) (78 - 105)  BP: 99/62 (21 @ 04:00) (92/51 - 111/58)  RR: 16 (21 @ 04:00) (16 - 18)  SpO2: 98% (21 @ 04:00) (97% - 99%)    Gen: NAD, AOx3  CV: RRR  Pulm: CTAB  Breast: Nontender, non-engorged   Abdomen:  Soft, non-tender, non-distended, +bowel sounds  Uterus:  Fundus firm below umbilicus  VE:  +Lochia  Ext:  Non-tender and non-edematous                          11.5   23.52 )-----------( 406      ( 2021 16:12 )             35.2

## 2021-01-13 VITALS
DIASTOLIC BLOOD PRESSURE: 61 MMHG | TEMPERATURE: 98 F | RESPIRATION RATE: 16 BRPM | HEART RATE: 71 BPM | OXYGEN SATURATION: 97 % | SYSTOLIC BLOOD PRESSURE: 100 MMHG

## 2021-01-13 DIAGNOSIS — O34.219 MATERNAL CARE FOR UNSPECIFIED TYPE SCAR FROM PREVIOUS CESAREAN DELIVERY: ICD-10-CM

## 2021-01-13 LAB — MEV IGM SER-ACNC: <20 AU/ML — SIGNIFICANT CHANGE UP (ref 0–19.9)

## 2021-01-13 PROCEDURE — 87389 HIV-1 AG W/HIV-1&-2 AB AG IA: CPT

## 2021-01-13 PROCEDURE — 80307 DRUG TEST PRSMV CHEM ANLYZR: CPT

## 2021-01-13 PROCEDURE — 85027 COMPLETE CBC AUTOMATED: CPT

## 2021-01-13 PROCEDURE — 59025 FETAL NON-STRESS TEST: CPT

## 2021-01-13 PROCEDURE — 86850 RBC ANTIBODY SCREEN: CPT

## 2021-01-13 PROCEDURE — 86901 BLOOD TYPING SEROLOGIC RH(D): CPT

## 2021-01-13 PROCEDURE — 86900 BLOOD TYPING SEROLOGIC ABO: CPT

## 2021-01-13 PROCEDURE — 86703 HIV-1/HIV-2 1 RESULT ANTBDY: CPT

## 2021-01-13 PROCEDURE — G0463: CPT

## 2021-01-13 PROCEDURE — U0005: CPT

## 2021-01-13 PROCEDURE — 86765 RUBEOLA ANTIBODY: CPT

## 2021-01-13 PROCEDURE — 88307 TISSUE EXAM BY PATHOLOGIST: CPT

## 2021-01-13 PROCEDURE — U0003: CPT

## 2021-01-13 PROCEDURE — 85025 COMPLETE CBC W/AUTO DIFF WBC: CPT

## 2021-01-13 PROCEDURE — 86769 SARS-COV-2 COVID-19 ANTIBODY: CPT

## 2021-01-13 PROCEDURE — 87340 HEPATITIS B SURFACE AG IA: CPT

## 2021-01-13 PROCEDURE — 86762 RUBELLA ANTIBODY: CPT

## 2021-01-13 PROCEDURE — 86780 TREPONEMA PALLIDUM: CPT

## 2021-01-13 PROCEDURE — 59050 FETAL MONITOR W/REPORT: CPT

## 2021-01-13 PROCEDURE — 36415 COLL VENOUS BLD VENIPUNCTURE: CPT

## 2021-01-13 NOTE — PROGRESS NOTE ADULT - ASSESSMENT
A/P:  Patient is a 29yo  now PPD#2 s/p spontaneous vaginal delivery after  section at 38 2/7 weeks gestation complicated by +THC and + amphetamines on Utox  -Stable  -Postpartum CBC stable, leukocytosis downtrending  -Voiding, tolerating PO, bowel function nml   -Advance care as tolerated   -Continue routine postpartum care and education.  -Healthy female infant  -Patient stable obstetrically, pending CPS clearance and peds clearance for discharge

## 2021-01-13 NOTE — PROGRESS NOTE ADULT - SUBJECTIVE AND OBJECTIVE BOX
KAMILLE MCHUGH is a 31yo  now PPD#2 s/p spontaneous vaginal delivery after  section at 38 2/7 weeks gestation complicated by +THC and + amphetamines on Utox    S:    The patient has no complaints.  Pain controlled with current medications.   She is ambulating without difficulty and tolerating PO   + flatus/-BM/+ voiding     O:    T(C): 36.9 (21 @ 15:16), Max: 36.9 (21 @ 15:16)  HR: 80 (21 @ 15:16) (80 - 80)  BP: 107/68 (21 @ 15:16) (107/68 - 107/68)  RR: 18 (21 @ 15:16) (18 - 18)  SpO2: 100% (21 @ 15:16) (100% - 100%)    Gen: NAD, AOx3  CV: RRR  Pulm: CTAB  Breast: nontender, non-engorged   Abdomen:  soft, non-tender, non-distended, +bowel sounds.  Uterus:  Fundus firm below umbilicus  VE:  +lochia  Ext:  Non-tender.                          10.1   19.37 )-----------( 370      ( 2021 06:07 )             31.5

## 2021-01-19 LAB — SURGICAL PATHOLOGY STUDY: SIGNIFICANT CHANGE UP

## 2021-12-28 NOTE — OB RN DELIVERY SUMMARY - NS_DELBLDTRANSFUS_OBGYN_ALL_OB
"""Informed patient that their capsular opacification is not visually significant or does not meet the minimum criteria for capsulotomy.  Recommended attention to PCO symptoms No

## 2022-03-30 NOTE — OB RN DELIVERY SUMMARY - NSVAGDELIVERYA_OBGYN_ALL_OB
3/30/2022    Assessment/Plan      Diagnoses and all orders for this visit:    Hypothyroidism due to Hashimoto's thyroiditis  -     Comprehensive metabolic panel Lab Collect; Future  -     CBC and differential Lab Collect; Future  -     Ferritin Lab Collect; Future  -     TSH, 3rd generation Lab Collect; Future  -     T4, free Lab Collect; Future  -     T3, free; Future    Insulin resistance  -     Comprehensive metabolic panel Lab Collect; Future  -     CBC and differential Lab Collect; Future  -     Ferritin Lab Collect; Future  -     TSH, 3rd generation Lab Collect; Future  -     T4, free Lab Collect; Future  -     T3, free; Future    Vitamin D deficiency  -     Comprehensive metabolic panel Lab Collect; Future  -     CBC and differential Lab Collect; Future  -     Ferritin Lab Collect; Future  -     TSH, 3rd generation Lab Collect; Future  -     T4, free Lab Collect; Future  -     T3, free; Future  -     Vitamin D 25 hydroxy Lab Collect; Future    Weight gain  -     Comprehensive metabolic panel Lab Collect; Future  -     CBC and differential Lab Collect; Future  -     Ferritin Lab Collect; Future  -     TSH, 3rd generation Lab Collect; Future  -     T4, free Lab Collect; Future  -     T3, free; Future    Low ferritin  -     Comprehensive metabolic panel Lab Collect; Future  -     CBC and differential Lab Collect; Future  -     Ferritin Lab Collect; Future  -     TSH, 3rd generation Lab Collect; Future  -     T4, free Lab Collect; Future  -     T3, free; Future    Hair loss  -     Comprehensive metabolic panel Lab Collect; Future  -     CBC and differential Lab Collect; Future  -     Ferritin Lab Collect; Future  -     TSH, 3rd generation Lab Collect; Future  -     T4, free Lab Collect; Future  -     T3, free; Future        Assessment/Plan:  1  Hypothyroidism due to Hashimoto's thyroiditis  Most recent thyroid function tests are normal   She is biochemically and clinically euthyroid    She will continue the same Synthroid brand 100 mcg daily and liothyronine 5 mcg daily  2  Vitamin-D deficiency  She will continue same vitamin-D 2672-6028 units daily  I will check a vitamin-D level with her next visit  3  Insulin resistance with abnormal weight gain  Blood sugars and insulin levels are improved  She is going to continue working on dietary changes and exercise to keep this under control  4  Low ferritin with hair loss  Her ferritin is low demonstrating low iron stores  This may be contributing some to her fatigue and hair loss  I have asked her to start iron 325 mg daily for the 1st month and then she can drop down to 3 times a week  She has been asked to take it at least 3-4 hours away from when she takes her thyroid medication  I have asked her to follow up in 3 months with preceding TSH, free T4, free T3, CMP, 25 hydroxy vitamin-D level, CBC, and ferritin level  CC:  Hypothyroid, vitamin-D deficiency, insulin resistance, weight gain follow-up    History of Present Illness     HPI: Morteza Wayne is a 53y o  year old female with history of hypothyroidism due to Hashimoto's thyroiditis, vitamin-D deficiency, insulin resistance with weight gain for follow-up visit  She developed hypothyroidism due to Hashimoto's thyroiditis around 2011  She has been on thyroid hormone for replacement purposes ever since  She did at 1 point take Armor Thyroid but switched to a combination of Synthroid and liothyronine in the summer 2021  She has a history of thyroid nodules which were biopsied by ENT with inconclusive biopsy results but the nodules have since resolved on ultrasound  She takes Synthroid brand 100 mcg daily and liothyronine 5 mcg daily  She denies heat or cold intolerance  She denies palpitation, tremors, or weight changes  She denies insomnia but has some fatigue even on awakening  She denies diarrhea or constipation  She has hair loss and breaking hair    She has dry skin and brittle nails  She denies diplopia  Menstrual cycles occur on a regular monthly basis but have been more heavy in flow recently  She denies compressive thyroid symptoms or difficulties with swallowing  She has insulin resistance and weight gain and last visit was to start metformin  mg 2 tablets daily  She did not start it at yet  She did prednisone for 5 days with wheezing since last visit  Has been dieting exercise as best as able  Weight is stable  She denies polyuria, polydipsia, or polyphagia  She has nocturia due to drinking a lot of water  She denies blurry vision or extremity paresthesias  She has a history of vitamin-D deficiency and takes vitamin-D 2646-0100 units daily  Review of Systems   Constitutional: Positive for fatigue  Negative for unexpected weight change  Very fatigued even on awakening  HENT: Negative for trouble swallowing  Eyes: Negative for visual disturbance  No diplopia  Respiratory: Positive for wheezing  Negative for chest tightness and shortness of breath  Wheezes on awakening  Cardiovascular: Negative for chest pain and palpitations  Gastrointestinal: Negative for abdominal pain, constipation, diarrhea and nausea  Endocrine: Negative for cold intolerance, heat intolerance, polydipsia, polyphagia and polyuria  Nocturia due to drinking a lot of water at 10 pm  Not drinking as much during the day  Will wake up to once a night recently, was up to 3 times a night in the past     Genitourinary:        Menses regular on a monthly basis  Has been having more heavy flow these days  Skin: Negative for rash  Hair loss with breaking  Had lost the eyelashes in the past but grew back  Saw derm who was concerned about ferritin level  Has dry skin  Has brittle nails  Neurological: Negative for dizziness, tremors, light-headedness, numbness and headaches  Occasional tremors if last eating  Psychiatric/Behavioral: Negative for sleep disturbance  She is considering getting evaluated for sleep apnea as has a deviated septum and brother and father have it and son is to be tested  Mother says she makes a lot of noses at night, wears a nasal strip at night  Historical Information   Past Medical History:   Diagnosis Date    Asthma     induced by exercise or smoke   Deviated septum     GERD (gastroesophageal reflux disease)     Nasal polyps      Past Surgical History:   Procedure Laterality Date     SECTION      X 2     Social History   Social History     Substance and Sexual Activity   Alcohol Use Never     Social History     Substance and Sexual Activity   Drug Use Never     Social History     Tobacco Use   Smoking Status Never Smoker   Smokeless Tobacco Never Used     Family History:   Family History   Problem Relation Age of Onset    Hypertension Mother     Hashimoto's thyroiditis Mother     Hypertension Father     Sleep apnea Father     Hypertension Brother     Thyroid disease unspecified Brother     Sleep apnea Brother     Hyperlipidemia Brother     Autism spectrum disorder Brother         suspected    No Known Problems Son     No Known Problems Daughter     Diabetes type II Maternal Grandmother        Meds/Allergies   Current Outpatient Medications   Medication Sig Dispense Refill    albuterol (PROVENTIL HFA,VENTOLIN HFA) 90 mcg/act inhaler       Ascorbic Acid (Vitamin C) 500 MG CAPS Take by mouth daily With vitamin D 1000 units and zinc      budesonide-formoterol (SYMBICORT) 160-4 5 mcg/act inhaler Inhale 2 puffs 2 (two) times a day Rinse mouth after use        Cholecalciferol (Vitamin D3) 50 MCG (2000 UT) TABS 2578-9357 units daily      COLLAGEN PO Take by mouth Powder daily      Lactobacillus (PROBIOTIC ACIDOPHILUS PO) Take by mouth daily      liothyronine (CYTOMEL) 5 mcg tablet Take 1 tablet (5 mcg total) by mouth daily 90 tablet 3    losartan (COZAAR) 50 mg tablet Take 50 mg by mouth daily        Magnesium 200 MG TABS Take by mouth daily      mometasone (Nasonex) 50 mcg/act nasal spray every 24 hours      Multiple Vitamins-Minerals (MULTIVITAMIN WOMEN PO) Take by mouth daily      Omega-3 Fatty Acids (fish oil) 1,000 mg Take 1,000 mg by mouth daily      omeprazole (PriLOSEC) 20 mg delayed release capsule Take 20 mg by mouth daily      Synthroid 100 MCG tablet Take 1 tablet (100 mcg total) by mouth daily 90 tablet 3    zinc gluconate 50 mg tablet Take 50 mg by mouth daily      metFORMIN (GLUCOPHAGE-XR) 500 mg 24 hr tablet Take 1 tablet (500 mg total) by mouth 2 (two) times a day with meals (Patient not taking: Reported on 3/30/2022 ) 180 tablet 2     No current facility-administered medications for this visit  Allergies   Allergen Reactions    Sulfamethoxazole-Trimethoprim Hives       Objective   Vitals: Blood pressure 112/78, pulse 98, height 5' 2 5" (1 588 m), weight 93 9 kg (207 lb), SpO2 98 %  Invasive Devices  Report    None                 Physical Exam  Vitals reviewed  Constitutional:       Appearance: Normal appearance  She is well-developed  HENT:      Head: Normocephalic and atraumatic  Eyes:      Extraocular Movements: Extraocular movements intact  Conjunctiva/sclera: Conjunctivae normal       Comments: No lid lag, stare, proptosis, or periorbital edema  Neck:      Thyroid: No thyromegaly  Vascular: No carotid bruit  Comments: Thyroid normal in size  No palpable thyroid nodules  Cardiovascular:      Rate and Rhythm: Normal rate and regular rhythm  Heart sounds: Normal heart sounds  No murmur heard  Pulmonary:      Effort: Pulmonary effort is normal       Breath sounds: Normal breath sounds  No wheezing  Abdominal:      Palpations: Abdomen is soft  Musculoskeletal:         General: No deformity  Normal range of motion  Cervical back: Normal range of motion and neck supple        Right lower leg: No edema  Left lower leg: No edema  Comments: No tremor of the outstretched hands  Lymphadenopathy:      Cervical: No cervical adenopathy  Skin:     General: Skin is warm and dry  Findings: No rash  Comments: Skin tags around the neck  Neurological:      Mental Status: She is alert and oriented to person, place, and time  Deep Tendon Reflexes: Reflexes are normal and symmetric  Comments: Deep tendon reflexes normal          The history was obtained from the review of the chart and from the patient  Lab Results:      Blood work done on 03/23/2022 showed a TSH of 2 55 with a free T4 of 1 07 and a free T3 of 3 2  CBC is normal   Ferritin level is 14  CMP showed a glucose of 81 fasting but was otherwise normal   Fasting insulin level is 11 6  Hemoglobin A1c is 5 6%      Lab Results   Component Value Date    CREATININE 0 79 03/23/2022    CREATININE 0 76 12/03/2021    CREATININE 0 77 08/27/2021    BUN 16 03/23/2022    K 4 1 03/23/2022     03/23/2022    CO2 22 03/23/2022     eGFR   Date Value Ref Range Status   03/23/2022 93 >59 mL/min/1 73 Final       Lab Results   Component Value Date    ALT 18 03/23/2022    AST 20 03/23/2022       Lab Results   Component Value Date    TSH 2 550 03/23/2022    FREET4 1 07 03/23/2022             Future Appointments   Date Time Provider Jonathon Alejandra   7/22/2022 11:40 AM Ghulam Bauman MD [facility]

## 2022-09-08 NOTE — OB RN PATIENT PROFILE - LIMIT VISITORS, INFANT PROFILE
What Is The Reason For Today's Visit?: Full Body Skin Examination What Is The Reason For Today's Visit? (Being Monitored For X): the re-examination of lesions previously examined no

## 2022-10-01 NOTE — OB PROVIDER DELIVERY SUMMARY - AMNIOTIC FLUID COLOR, LABOR
airway patent/good air movement/respirations non-labored clear to auscultation bilaterally/no wheezes/no rales/no rhonchi clear to auscultation bilaterally/no wheezes/no rales/no rhonchi clear
